# Patient Record
Sex: MALE | Race: WHITE | HISPANIC OR LATINO | Employment: FULL TIME | URBAN - METROPOLITAN AREA
[De-identification: names, ages, dates, MRNs, and addresses within clinical notes are randomized per-mention and may not be internally consistent; named-entity substitution may affect disease eponyms.]

---

## 2024-01-05 ENCOUNTER — OFFICE VISIT (OUTPATIENT)
Dept: NEUROLOGY | Facility: CLINIC | Age: 34
End: 2024-01-05
Payer: COMMERCIAL

## 2024-01-05 VITALS
OXYGEN SATURATION: 100 % | HEART RATE: 83 BPM | HEIGHT: 70 IN | SYSTOLIC BLOOD PRESSURE: 146 MMHG | DIASTOLIC BLOOD PRESSURE: 82 MMHG | WEIGHT: 176 LBS | BODY MASS INDEX: 25.2 KG/M2

## 2024-01-05 DIAGNOSIS — G43.109 MIGRAINE WITH AURA AND WITHOUT STATUS MIGRAINOSUS, NOT INTRACTABLE: Primary | ICD-10-CM

## 2024-01-05 PROCEDURE — 99203 OFFICE O/P NEW LOW 30 MIN: CPT | Performed by: PSYCHIATRY & NEUROLOGY

## 2024-01-05 PROCEDURE — 1160F RVW MEDS BY RX/DR IN RCRD: CPT | Performed by: PSYCHIATRY & NEUROLOGY

## 2024-01-05 PROCEDURE — 1159F MED LIST DOCD IN RCRD: CPT | Performed by: PSYCHIATRY & NEUROLOGY

## 2024-01-05 RX ORDER — PROPRANOLOL HYDROCHLORIDE 20 MG/1
20 TABLET ORAL 2 TIMES DAILY
Qty: 60 TABLET | Refills: 2 | Status: SHIPPED | OUTPATIENT
Start: 2024-01-05

## 2024-01-05 NOTE — ASSESSMENT & PLAN NOTE
33 year old right handed man with migraines with visual aura.  He was referred by his eye doctor as he was having vision changes thought to be related to migraine auras.  Starting January of last year he started noticing loss of peripheral vision one side at a time followed by a severe headache.  This lasts for 20 minutes or so but the headache takes 4-6 hours up to 24 hours in duration.  The headaches are unilateral involving the right side of his head and behind his right eye.  He has some light and sound sensitivity.  He has not noticed significant nausea.  He was having increasing frequency and he tells me after he started taking the omega 3 he was not having any further issues.  He was working hard in the past and since changing his job and tells me he has some anxiety due to his family not being here but he is trying to keep this under control.  He sleep about 6-7 hours usually and if he sleeps more than 9 hours he will wake up with headaches.  There is family history of migraines in his aunt and cousins.  He has not had the vision issues for the past 3 months.  He does get headaches sometimes.  He does have anxiety.  He does not smoke and does not use drugs.  He drinks a beer once a while.  I spoke with him about his symptoms which appears to be related to migraines with aura.  I will start him on propranolol which is a migraine preventative and can also help with anxiety for further assistance.  As his symptoms have improved we will hold off on any brain imaging at this time and he is in agreement and does not want to pursue imaging at this time.  Discussed migraine triggers and lifestyle modifications.

## 2024-01-05 NOTE — PROGRESS NOTES
Chief Complaint  Migraine (Not seen neurologist before- had vision loss before migraine- Eye doctor referred him here)    Subjective          Russel Saleh presents to [facility] NEUROLOGY for   HISTORY OF PRESENT ILLNESS:    Russel Saleh is a 33 year old right handed man who presents to neurology clinic for initial evaluation and treatment of migraines.  He was referred by his eye doctor as he was having vision changes thought to be related to migraine auras.  Starting January of last year he started noticing loss of peripheral vision one side at a time followed by a severe headache.  This lasts for 20 minutes or so but the headache takes 4-6 hours up to 24 hours in duration.  The headaches are unilateral involving the right side of his head and behind his right eye.  He has some light and sound sensitivity.  He has not noticed significant nausea.  He was having increasing frequency and he tells me after he started taking the omega 3 he was not having any further issues.  He was working hard in the past and since changing his job and tells me he has some anxiety due to his family not being here but he is trying to keep this under control.  He sleep about 6-7 hours usually and if he sleeps more than 9 hours he will wake up with headaches.  There is family history of migraines in his aunt and cousins.  He has not had the vision issues for the past 3 months.  He does get headaches sometimes.  He does have anxiety.  He does not smoke and does not use drugs.  He drinks a beer once a while.      Past Medical History:   Diagnosis Date    Migraine with aura and without status migrainosus, not intractable 1/5/2024    Vision loss 04/04/23        Family History   Problem Relation Age of Onset    Other Father         eye issues        Social History     Socioeconomic History    Marital status:    Tobacco Use    Smoking status: Never   Vaping Use    Vaping Use: Never used   Substance and  Mom informed     "Sexual Activity    Alcohol use: Not Currently     Alcohol/week: 4.0 standard drinks of alcohol     Types: 4 Cans of beer per week    Drug use: Never    Sexual activity: Yes        I have reviewed and confirmed the accuracy of the ROS as documented by the MA/LPN/RN Jace Mosquera MD   Review of Systems   Constitutional:  Negative for activity change and appetite change.   HENT:  Negative for trouble swallowing and voice change.    Eyes:  Positive for pain. Negative for blurred vision, redness and visual disturbance.   Gastrointestinal:  Negative for nausea and vomiting.   Musculoskeletal:  Positive for back pain. Negative for gait problem and neck pain.   Allergic/Immunologic: Negative for environmental allergies and food allergies.   Neurological:  Positive for headache. Negative for dizziness, tremors, seizures, syncope, facial asymmetry, speech difficulty, weakness, light-headedness, numbness, memory problem and confusion.   Psychiatric/Behavioral:  Positive for sleep disturbance. Negative for agitation, behavioral problems, decreased concentration, dysphoric mood, hallucinations, self-injury, suicidal ideas, negative for hyperactivity, depressed mood and stress. The patient is not nervous/anxious.         Objective   Vital Signs:   /82   Pulse 83   Ht 177.8 cm (70\")   Wt 79.8 kg (176 lb)   SpO2 100%   BMI 25.25 kg/m²       PHYSICAL EXAM:    General   Mental Status - Alert. General Appearance - Well developed, Well groomed, Oriented and Cooperative. Orientation - Oriented X3.       Head and Neck  Head - normocephalic, atraumatic with no lesions or palpable masses.  Neck    Global Assessment - supple.       Eye   Sclera/Conjunctiva - Bilateral - Normal.    ENMT  Mouth and Throat   Oral Cavity/Oropharynx: Oropharynx - the soft palate,uvula and tongue are normal in appearance.    Chest and Lung Exam   Chest - lung clear to auscultation bilaterally.    Cardiovascular   Cardiovascular examination reveals  - " normal heart sounds, regular rate and rhythm.    Neurologic   Mental Status: Speech - Normal. Cognitive function - appropriate fund of knowledge. No impairment of attention, Impairment of concentration, impairment of long term memory or impairment of short term memory.  Cranial Nerves:   II Optic: Visual acuity - Left - Normal. Right - Normal. Visual fields - Normal (to confrontation).  III Oculomotor: Pupillary constriction - Left - Normal. Right - Normal.  VII Facial: - Normal Bilaterally.   IX Glossopharyngeal / X Vagus - Normal.  XI Accessory: Trapezius - Bilateral - Normal. Sternocleidomastoid - Bilateral - Normal.  XII Hypoglossal - Bilateral - Normal.  Eye Movements: - Normal Bilaterally.  Sensory:   Light Touch: Intact - Globally.  Motor:   Bulk and Contour: - Normal.  Tone: - Normal.  Tremor: Not present.  Strength: 5/5 normal muscle strength - All Muscles.   General Assessment of Reflexes: - deep tendon reflexes are normal. Coordination - No Impairment of finger-to-nose or Impairment of rapid alternating movements. Gait - Normal.       Result Review :                 Assessment and Plan    Problem List Items Addressed This Visit          Neuro    Migraine with aura and without status migrainosus, not intractable - Primary    Current Assessment & Plan     33 year old right handed man with migraines with visual aura.  He was referred by his eye doctor as he was having vision changes thought to be related to migraine auras.  Starting January of last year he started noticing loss of peripheral vision one side at a time followed by a severe headache.  This lasts for 20 minutes or so but the headache takes 4-6 hours up to 24 hours in duration.  The headaches are unilateral involving the right side of his head and behind his right eye.  He has some light and sound sensitivity.  He has not noticed significant nausea.  He was having increasing frequency and he tells me after he started taking the omega 3 he was not  having any further issues.  He was working hard in the past and since changing his job and tells me he has some anxiety due to his family not being here but he is trying to keep this under control.  He sleep about 6-7 hours usually and if he sleeps more than 9 hours he will wake up with headaches.  There is family history of migraines in his aunt and cousins.  He has not had the vision issues for the past 3 months.  He does get headaches sometimes.  He does have anxiety.  He does not smoke and does not use drugs.  He drinks a beer once a while.  I spoke with him about his symptoms which appears to be related to migraines with aura.  I will start him on propranolol which is a migraine preventative and can also help with anxiety for further assistance.  As his symptoms have improved we will hold off on any brain imaging at this time and he is in agreement and does not want to pursue imaging at this time.  Discussed migraine triggers and lifestyle modifications.           Relevant Medications    propranolol (INDERAL) 20 MG tablet       Follow Up   Return in about 3 months (around 4/5/2024).  Patient was given instructions and counseling regarding his condition or for health maintenance advice. Please see specific information pulled into the AVS if appropriate.

## 2024-01-09 ENCOUNTER — PATIENT ROUNDING (BHMG ONLY) (OUTPATIENT)
Dept: NEUROLOGY | Facility: CLINIC | Age: 34
End: 2024-01-09
Payer: COMMERCIAL

## 2024-04-09 ENCOUNTER — OFFICE VISIT (OUTPATIENT)
Dept: NEUROLOGY | Facility: CLINIC | Age: 34
End: 2024-04-09
Payer: COMMERCIAL

## 2024-04-09 VITALS
BODY MASS INDEX: 25.05 KG/M2 | HEIGHT: 70 IN | WEIGHT: 175 LBS | DIASTOLIC BLOOD PRESSURE: 70 MMHG | SYSTOLIC BLOOD PRESSURE: 110 MMHG | OXYGEN SATURATION: 98 % | HEART RATE: 78 BPM

## 2024-04-09 DIAGNOSIS — G43.109 MIGRAINE WITH AURA AND WITHOUT STATUS MIGRAINOSUS, NOT INTRACTABLE: Primary | ICD-10-CM

## 2024-04-09 PROCEDURE — 1159F MED LIST DOCD IN RCRD: CPT | Performed by: PSYCHIATRY & NEUROLOGY

## 2024-04-09 PROCEDURE — 1160F RVW MEDS BY RX/DR IN RCRD: CPT | Performed by: PSYCHIATRY & NEUROLOGY

## 2024-04-09 PROCEDURE — 99213 OFFICE O/P EST LOW 20 MIN: CPT | Performed by: PSYCHIATRY & NEUROLOGY

## 2024-04-09 RX ORDER — PROPRANOLOL HYDROCHLORIDE 20 MG/1
20 TABLET ORAL 2 TIMES DAILY
Qty: 60 TABLET | Refills: 5 | Status: SHIPPED | OUTPATIENT
Start: 2024-04-09

## 2024-04-09 RX ORDER — IBUPROFEN 800 MG/1
800 TABLET ORAL EVERY 6 HOURS PRN
COMMUNITY

## 2024-04-09 NOTE — ASSESSMENT & PLAN NOTE
33 year old right handed man with migraines.  He was referred by his eye doctor as he was having vision changes thought to be related to migraine auras.  Starting January of last year he started noticing loss of peripheral vision one side at a time followed by a severe headache.  This lasts for 20 minutes or so but the headache takes 4-6 hours up to 24 hours in duration.  The headaches are unilateral involving the right side of his head and behind his right eye.  He has some light and sound sensitivity.  He has not noticed significant nausea.  He was having increasing frequency and he tells me after he started taking the omega 3 he was not having any further issues.  He was working hard in the past and since changing his job and tells me he has some anxiety due to his family not being here but he is trying to keep this under control.  He sleep about 6-7 hours usually and if he sleeps more than 9 hours he will wake up with headaches.  There is family history of migraines in his aunt and cousins.  He has not had the vision issues any further.  He does get headaches sometimes.  He does have anxiety.  He does not smoke and does not use drugs.  He drinks a beer once a while.  I started him on propranolol which has which has helped without any side effects and he would like to continue this medicine as he has remained migraine free.

## 2024-04-09 NOTE — PROGRESS NOTES
Chief Complaint  Migraine (Propranolol bid doing well- they have disappeared-)    Subjective          Russel Saleh presents to [facility] NEUROLOG[facility] for   HISTORY OF PRESENT ILLNESS:    Russel Saleh is a 33 year old right handed man who returns to neurology clinic for follow up evaluation and treatment of migraines.  He was referred by his eye doctor as he was having vision changes thought to be related to migraine auras.  Starting January of last year he started noticing loss of peripheral vision one side at a time followed by a severe headache.  This lasts for 20 minutes or so but the headache takes 4-6 hours up to 24 hours in duration.  The headaches are unilateral involving the right side of his head and behind his right eye.  He has some light and sound sensitivity.  He has not noticed significant nausea.  He was having increasing frequency and he tells me after he started taking the omega 3 he was not having any further issues.  He was working hard in the past and since changing his job and tells me he has some anxiety due to his family not being here but he is trying to keep this under control.  He sleep about 6-7 hours usually and if he sleeps more than 9 hours he will wake up with headaches.  There is family history of migraines in his aunt and cousins.  He has not had the vision issues any further.  He does get headaches sometimes.  He does have anxiety.  He does not smoke and does not use drugs.  He drinks a beer once a while.  I started him on propranolol which has helped without any side effects.      Past Medical History:   Diagnosis Date    Migraine with aura and without status migrainosus, not intractable 1/5/2024    Vision loss 04/04/23        Family History   Problem Relation Age of Onset    Other Father         eye issues        Social History     Socioeconomic History    Marital status:    Tobacco Use    Smoking status: Never   Vaping Use    Vaping status:  "Never Used   Substance and Sexual Activity    Alcohol use: Not Currently     Alcohol/week: 4.0 standard drinks of alcohol     Types: 4 Cans of beer per week    Drug use: Never    Sexual activity: Yes        I have reviewed and confirmed the accuracy of the ROS as documented by the MA/LPN/RN Jace Mosquera MD   Review of Systems   Neurological:  Positive for headache and memory problem. Negative for dizziness, tremors, seizures, syncope, facial asymmetry, speech difficulty, weakness, light-headedness, numbness and confusion.   Psychiatric/Behavioral:  Negative for agitation, behavioral problems, decreased concentration, dysphoric mood, hallucinations, self-injury, sleep disturbance, suicidal ideas, negative for hyperactivity, depressed mood and stress. The patient is not nervous/anxious.         Objective   Vital Signs:   /70   Pulse 78   Ht 177.8 cm (70\")   Wt 79.4 kg (175 lb)   SpO2 98%   BMI 25.11 kg/m²       PHYSICAL EXAM:    General   Mental Status - Alert. General Appearance - Well developed, Well groomed, Oriented and Cooperative. Orientation - Oriented X3.       Head and Neck  Head - normocephalic, atraumatic with no lesions or palpable masses.  Neck    Global Assessment - supple.       Eye   Sclera/Conjunctiva - Bilateral - Normal.    ENMT  Mouth and Throat   Oral Cavity/Oropharynx: Oropharynx - the soft palate,uvula and tongue are normal in appearance.    Chest and Lung Exam   Chest - lung clear to auscultation bilaterally.    Cardiovascular   Cardiovascular examination reveals  - normal heart sounds, regular rate and rhythm.    Neurologic   Mental Status: Speech - Normal. Cognitive function - appropriate fund of knowledge. No impairment of attention, Impairment of concentration, impairment of long term memory or impairment of short term memory.  Cranial Nerves:   II Optic: Visual acuity - Left - Normal. Right - Normal. Visual fields - Normal (to confrontation).  III Oculomotor: Pupillary " constriction - Left - Normal. Right - Normal.  VII Facial: - Normal Bilaterally.   IX Glossopharyngeal / X Vagus - Normal.  XI Accessory: Trapezius - Bilateral - Normal. Sternocleidomastoid - Bilateral - Normal.  XII Hypoglossal - Bilateral - Normal.  Eye Movements: - Normal Bilaterally.  Sensory:   Light Touch: Intact - Globally.  Motor:   Bulk and Contour: - Normal.  Tone: - Normal.  Tremor: Not present.  Strength: 5/5 normal muscle strength - All Muscles.   General Assessment of Reflexes: - deep tendon reflexes are normal. Coordination - No Impairment of finger-to-nose or Impairment of rapid alternating movements. Gait - Normal.       Result Review :                 Assessment and Plan    Problem List Items Addressed This Visit          Neuro    Migraine with aura and without status migrainosus, not intractable - Primary    Current Assessment & Plan     33 year old right handed man with migraines.  He was referred by his eye doctor as he was having vision changes thought to be related to migraine auras.  Starting January of last year he started noticing loss of peripheral vision one side at a time followed by a severe headache.  This lasts for 20 minutes or so but the headache takes 4-6 hours up to 24 hours in duration.  The headaches are unilateral involving the right side of his head and behind his right eye.  He has some light and sound sensitivity.  He has not noticed significant nausea.  He was having increasing frequency and he tells me after he started taking the omega 3 he was not having any further issues.  He was working hard in the past and since changing his job and tells me he has some anxiety due to his family not being here but he is trying to keep this under control.  He sleep about 6-7 hours usually and if he sleeps more than 9 hours he will wake up with headaches.  There is family history of migraines in his aunt and cousins.  He has not had the vision issues any further.  He does get headaches  sometimes.  He does have anxiety.  He does not smoke and does not use drugs.  He drinks a beer once a while.  I started him on propranolol which has which has helped without any side effects and he would like to continue this medicine as he has remained migraine free.               Relevant Medications    ibuprofen (ADVIL,MOTRIN) 800 MG tablet    propranolol (INDERAL) 20 MG tablet       Follow Up   Return in about 6 months (around 10/9/2024).  Patient was given instructions and counseling regarding his condition or for health maintenance advice. Please see specific information pulled into the AVS if appropriate.

## 2024-04-10 DIAGNOSIS — G43.109 MIGRAINE WITH AURA AND WITHOUT STATUS MIGRAINOSUS, NOT INTRACTABLE: ICD-10-CM

## 2024-08-21 ENCOUNTER — TRANSCRIBE ORDERS (OUTPATIENT)
Dept: ADMINISTRATIVE | Facility: HOSPITAL | Age: 34
End: 2024-08-21
Payer: COMMERCIAL

## 2024-10-11 DIAGNOSIS — G43.109 MIGRAINE WITH AURA AND WITHOUT STATUS MIGRAINOSUS, NOT INTRACTABLE: ICD-10-CM

## 2024-10-15 ENCOUNTER — OFFICE VISIT (OUTPATIENT)
Dept: NEUROLOGY | Facility: CLINIC | Age: 34
End: 2024-10-15
Payer: COMMERCIAL

## 2024-10-15 VITALS
HEART RATE: 68 BPM | DIASTOLIC BLOOD PRESSURE: 76 MMHG | HEIGHT: 70 IN | BODY MASS INDEX: 26.05 KG/M2 | OXYGEN SATURATION: 98 % | SYSTOLIC BLOOD PRESSURE: 120 MMHG | WEIGHT: 182 LBS

## 2024-10-15 DIAGNOSIS — G43.109 MIGRAINE WITH AURA AND WITHOUT STATUS MIGRAINOSUS, NOT INTRACTABLE: Primary | ICD-10-CM

## 2024-10-15 PROCEDURE — 99213 OFFICE O/P EST LOW 20 MIN: CPT | Performed by: PSYCHIATRY & NEUROLOGY

## 2024-10-15 RX ORDER — PROPRANOLOL HCL 20 MG
20 TABLET ORAL 2 TIMES DAILY
Qty: 60 TABLET | Refills: 5 | OUTPATIENT
Start: 2024-10-15

## 2024-10-15 RX ORDER — PROPRANOLOL HCL 20 MG
20 TABLET ORAL 2 TIMES DAILY
Qty: 60 TABLET | Refills: 5 | Status: SHIPPED | OUTPATIENT
Start: 2024-10-15

## 2024-10-15 NOTE — ASSESSMENT & PLAN NOTE
33 year old right handed man with migraines.  He was referred by his eye doctor as he was having vision changes thought to be related to migraine auras.  Starting January of last year he started noticing loss of peripheral vision one side at a time followed by a severe headache.  This lasts for 20 minutes or so but the headache takes 4-6 hours up to 24 hours in duration.  The headaches are unilateral involving the right side of his head and behind his right eye.  He has some light and sound sensitivity.  He has not noticed significant nausea.  He was having increasing frequency and he tells me after he started taking the omega 3 he was not having any further issues.  He was working hard in the past and since changing his job and tells me he has some anxiety due to his family not being here but he is trying to keep this under control.  He sleep about 6-7 hours usually and if he sleeps more than 9 hours he will wake up with headaches.  There is family history of migraines in his aunt and cousins.  He has not had the vision issues any further.  He does get headaches sometimes.  He does have anxiety.  He does not smoke and does not use drugs.  He drinks a beer once a while.  I started him on propranolol which has helped without any side effects.  He tells me he had a brain MRI scan performed in 9/5/2024 which did not demonstrate any acute intracranial abnormalities. I will continue the propranolol at current dose which is helping and he is doing well overall.  Will follow up in 6 months.

## 2024-10-15 NOTE — PROGRESS NOTES
Chief Complaint  Migraine (No new episodes MRI [facility] )    Subjective          Russel Saleh presents to [facility] NEUROLOGY for   HISTORY OF PRESENT ILLNESS:    Russel Saleh is a 33 year old right handed man who returns to neurology clinic for follow up evaluation and treatment of migraines.  He was referred by his eye doctor as he was having vision changes thought to be related to migraine auras.  Starting January of last year he started noticing loss of peripheral vision one side at a time followed by a severe headache.  This lasts for 20 minutes or so but the headache takes 4-6 hours up to 24 hours in duration.  The headaches are unilateral involving the right side of his head and behind his right eye.  He has some light and sound sensitivity.  He has not noticed significant nausea.  He was having increasing frequency and he tells me after he started taking the omega 3 he was not having any further issues.  He was working hard in the past and since changing his job and tells me he has some anxiety due to his family not being here but he is trying to keep this under control.  He sleep about 6-7 hours usually and if he sleeps more than 9 hours he will wake up with headaches.  There is family history of migraines in his aunt and cousins.  He has not had the vision issues any further.  He does get headaches sometimes.  He does have anxiety.  He does not smoke and does not use drugs.  He drinks a beer once a while.  I started him on propranolol which has helped without any side effects.  He tells me he had a brain MRI scan performed in 9/5/2024 which did not demonstrate any acute intracranial abnormalities.     Past Medical History:   Diagnosis Date    Migraine with aura and without status migrainosus, not intractable 1/5/2024    Vision loss 04/04/23        Family History   Problem Relation Age of Onset    Other Father         eye issues        Social History  "    Socioeconomic History    Marital status:    Tobacco Use    Smoking status: Never   Vaping Use    Vaping status: Never Used   Substance and Sexual Activity    Alcohol use: Not Currently     Alcohol/week: 4.0 standard drinks of alcohol     Types: 4 Cans of beer per week    Drug use: Never    Sexual activity: Yes        I have reviewed and confirmed the accuracy of the ROS as documented by the MA/LPN/RN Jace Mosquera MD   Review of Systems   Eyes:  Positive for blurred vision. Negative for double vision.   Gastrointestinal:  Negative for nausea and vomiting.   Musculoskeletal:  Negative for neck pain and neck stiffness.   Neurological:  Positive for headache. Negative for dizziness, tremors, seizures, syncope, facial asymmetry, speech difficulty, weakness, light-headedness, numbness, memory problem and confusion.   Psychiatric/Behavioral:  Negative for agitation, behavioral problems, decreased concentration, dysphoric mood, hallucinations, self-injury, sleep disturbance, suicidal ideas, negative for hyperactivity, depressed mood and stress. The patient is not nervous/anxious.         Objective   Vital Signs:   /76   Pulse 68   Ht 177.8 cm (70\")   Wt 82.6 kg (182 lb)   SpO2 98%   BMI 26.11 kg/m²       PHYSICAL EXAM:    General   Mental Status - Alert. General Appearance - Well developed, Well groomed, Oriented and Cooperative. Orientation - Oriented X3.       Head and Neck  Head - normocephalic, atraumatic with no lesions or palpable masses.  Neck    Global Assessment - supple.       Eye   Sclera/Conjunctiva - Bilateral - Normal.    ENMT  Mouth and Throat   Oral Cavity/Oropharynx: Oropharynx - the soft palate,uvula and tongue are normal in appearance.    Chest and Lung Exam   Chest - lung clear to auscultation bilaterally.    Cardiovascular   Cardiovascular examination reveals  - normal heart sounds, regular rate and rhythm.    Neurologic   Mental Status: Speech - Normal. Cognitive function - " appropriate fund of knowledge. No impairment of attention, Impairment of concentration, impairment of long term memory or impairment of short term memory.  Cranial Nerves:   II Optic: Visual acuity - Left - Normal. Right - Normal. Visual fields - Normal (to confrontation).  III Oculomotor: Pupillary constriction - Left - Normal. Right - Normal.  VII Facial: - Normal Bilaterally.   IX Glossopharyngeal / X Vagus - Normal.  XI Accessory: Trapezius - Bilateral - Normal. Sternocleidomastoid - Bilateral - Normal.  XII Hypoglossal - Bilateral - Normal.  Eye Movements: - Normal Bilaterally.  Sensory:   Light Touch: Intact - Globally.  Motor:   Bulk and Contour: - Normal.  Tone: - Normal.  Tremor: Not present.  Strength: 5/5 normal muscle strength - All Muscles.   General Assessment of Reflexes: - deep tendon reflexes are normal. Coordination - No Impairment of finger-to-nose or Impairment of rapid alternating movements. Gait - Normal.       Result Review :                 Assessment and Plan    Problem List Items Addressed This Visit       Migraine with aura and without status migrainosus, not intractable - Primary    Current Assessment & Plan     33 year old right handed man with migraines.  He was referred by his eye doctor as he was having vision changes thought to be related to migraine auras.  Starting January of last year he started noticing loss of peripheral vision one side at a time followed by a severe headache.  This lasts for 20 minutes or so but the headache takes 4-6 hours up to 24 hours in duration.  The headaches are unilateral involving the right side of his head and behind his right eye.  He has some light and sound sensitivity.  He has not noticed significant nausea.  He was having increasing frequency and he tells me after he started taking the omega 3 he was not having any further issues.  He was working hard in the past and since changing his job and tells me he has some anxiety due to his family not  being here but he is trying to keep this under control.  He sleep about 6-7 hours usually and if he sleeps more than 9 hours he will wake up with headaches.  There is family history of migraines in his aunt and cousins.  He has not had the vision issues any further.  He does get headaches sometimes.  He does have anxiety.  He does not smoke and does not use drugs.  He drinks a beer once a while.  I started him on propranolol which has helped without any side effects.  He tells me he had a brain MRI scan performed in 9/5/2024 which did not demonstrate any acute intracranial abnormalities. I will continue the propranolol at current dose which is helping and he is doing well overall.  Will follow up in 6 months.           Relevant Medications    propranolol (INDERAL) 20 MG tablet       Follow Up   Return in about 6 months (around 4/15/2025).  Patient was given instructions and counseling regarding his condition or for health maintenance advice. Please see specific information pulled into the AVS if appropriate.

## 2025-04-15 ENCOUNTER — OFFICE VISIT (OUTPATIENT)
Dept: NEUROLOGY | Facility: CLINIC | Age: 35
End: 2025-04-15
Payer: COMMERCIAL

## 2025-04-15 VITALS
BODY MASS INDEX: 25.48 KG/M2 | HEART RATE: 61 BPM | HEIGHT: 70 IN | OXYGEN SATURATION: 98 % | SYSTOLIC BLOOD PRESSURE: 124 MMHG | DIASTOLIC BLOOD PRESSURE: 68 MMHG | WEIGHT: 178 LBS

## 2025-04-15 DIAGNOSIS — G43.109 MIGRAINE WITH AURA AND WITHOUT STATUS MIGRAINOSUS, NOT INTRACTABLE: Primary | ICD-10-CM

## 2025-04-15 PROCEDURE — 99213 OFFICE O/P EST LOW 20 MIN: CPT | Performed by: PSYCHIATRY & NEUROLOGY

## 2025-04-15 RX ORDER — PROPRANOLOL HCL 20 MG
20 TABLET ORAL 2 TIMES DAILY
Qty: 180 TABLET | Refills: 1 | Status: SHIPPED | OUTPATIENT
Start: 2025-04-15

## 2025-04-15 NOTE — PROGRESS NOTES
Chief Complaint  Migraine (Has been doing really good, the other day he didn't sleep well and had one random headache)    Subjective          Russel Saleh presents to [facility] NEUROLOGY for   HISTORY OF PRESENT ILLNESS:    Russel Saleh is a 34 year old right handed man who returns to neurology clinic for follow up evaluation and treatment of migraines.  He was referred by his eye doctor as he was having vision changes thought to be related to migraine auras.  Starting January of 2023 he started noticing loss of peripheral vision one side at a time followed by a severe headache.  This lasts for 20 minutes or so but the headache takes 4-6 hours up to 24 hours in duration.  The headaches are unilateral involving the right side of his head and behind his right eye.  He has some light and sound sensitivity.  He has not noticed significant nausea.  He was having increasing frequency and he tells me after he started taking the omega 3 he was not having any further issues.  He was working hard in the past and since changing his job and tells me he has some anxiety due to his family not being here but he is trying to keep this under control and he is doing well with this as well.  He sleep about 6-7 hours usually and if he sleeps more than 9 hours he will wake up with headaches and he does not snore.  There is family history of migraines in his aunt and cousins.  He has not had the vision issues any further.  He does get headache rarely due to not sleeping well.  He does have anxiety.  He does not smoke and does not use drugs.  He drinks a beer once a while.  I started him on propranolol which has helped without any side effects and he is doing well with this medicine with improvement in anxiety and headaches.  He had a brain MRI scan performed in 9/5/2024 which did not demonstrate any acute intracranial abnormalities.  Overall he is doing well and happy with current dose of propranolol.   "    Past Medical History:   Diagnosis Date    Migraine with aura and without status migrainosus, not intractable 1/5/2024    Vision loss 04/04/23        Family History   Problem Relation Age of Onset    Other Father         eye issues        Social History     Socioeconomic History    Marital status:    Tobacco Use    Smoking status: Never   Vaping Use    Vaping status: Never Used   Substance and Sexual Activity    Alcohol use: Not Currently     Alcohol/week: 4.0 standard drinks of alcohol     Types: 4 Cans of beer per week    Drug use: Never    Sexual activity: Yes        I have reviewed and confirmed the accuracy of the ROS as documented by the MA/LPN/RN Jace Mosquera MD   Review of Systems   Neurological:  Positive for headache. Negative for dizziness, tremors, seizures, syncope, facial asymmetry, speech difficulty, weakness, light-headedness, numbness, memory problem and confusion.   Psychiatric/Behavioral:  Positive for sleep disturbance. Negative for agitation, behavioral problems, decreased concentration, dysphoric mood, hallucinations, self-injury, suicidal ideas, negative for hyperactivity, depressed mood and stress. The patient is not nervous/anxious.         Objective   Vital Signs:   /68   Pulse 61   Ht 177.8 cm (70\")   Wt 80.7 kg (178 lb)   SpO2 98%   BMI 25.54 kg/m²       PHYSICAL EXAM:    General   Mental Status - Alert. General Appearance - Well developed, Well groomed, Oriented and Cooperative. Orientation - Oriented X3.       Head and Neck  Head - normocephalic, atraumatic with no lesions or palpable masses.  Neck    Global Assessment - supple.       Eye   Sclera/Conjunctiva - Bilateral - Normal.    ENMT  Mouth and Throat   Oral Cavity/Oropharynx: Oropharynx - the soft palate,uvula and tongue are normal in appearance.    Chest and Lung Exam   Chest - lung clear to auscultation bilaterally.    Cardiovascular   Cardiovascular examination reveals  - normal heart sounds, regular " rate and rhythm.    Neurologic   Mental Status: Speech - Normal. Cognitive function - appropriate fund of knowledge. No impairment of attention, Impairment of concentration, impairment of long term memory or impairment of short term memory.  Cranial Nerves:   II Optic: Visual acuity - Left - Normal. Right - Normal. Visual fields - Normal (to confrontation).  III Oculomotor: Pupillary constriction - Left - Normal. Right - Normal.  VII Facial: - Normal Bilaterally.   IX Glossopharyngeal / X Vagus - Normal.  XI Accessory: Trapezius - Bilateral - Normal. Sternocleidomastoid - Bilateral - Normal.  XII Hypoglossal - Bilateral - Normal.  Eye Movements: - Normal Bilaterally.  Sensory:   Light Touch: Intact - Globally.  Motor:   Bulk and Contour: - Normal.  Tone: - Normal.  Tremor: Not present.  Strength: 5/5 normal muscle strength - All Muscles.   General Assessment of Reflexes: - deep tendon reflexes are normal. Coordination - No Impairment of finger-to-nose or Impairment of rapid alternating movements. Gait - Normal.       Result Review :                 Assessment and Plan    Problem List Items Addressed This Visit       Migraine with aura and without status migrainosus, not intractable - Primary    Current Assessment & Plan   34 year old right handed man with migraines which are well controlled with taking propranolol preventatively.  He was referred by his eye doctor as he was having vision changes thought to be related to migraine auras.  Starting January of 2023 he started noticing loss of peripheral vision one side at a time followed by a severe headache.  This lasts for 20 minutes or so but the headache takes 4-6 hours up to 24 hours in duration.  The headaches are unilateral involving the right side of his head and behind his right eye.  He has some light and sound sensitivity.  He has not noticed significant nausea.  He was having increasing frequency and he tells me after he started taking the omega 3 he was not  having any further issues.  He was working hard in the past and since changing his job and tells me he has some anxiety due to his family not being here but he is trying to keep this under control and he is doing well with this as well.  He sleep about 6-7 hours usually and if he sleeps more than 9 hours he will wake up with headaches and he does not snore.  There is family history of migraines in his aunt and cousins.  He has not had the vision issues any further.  He does get headache rarely due to not sleeping well.  He does have anxiety.  He does not smoke and does not use drugs.  He drinks a beer once a while.  I started him on propranolol which has helped without any side effects and he is doing well with this medicine with improvement in anxiety and headaches.  He had a brain MRI scan performed in 9/5/2024 which did not demonstrate any acute intracranial abnormalities.  Overall he is doing well and happy with current dose of propranolol for prevention and Tylenol as needed infrequently.  I will continue this regimen for patient today, he denies any side effects, he will try to reduce the dose and see if he continues to do well with regards to his migraines.  Discussed migraine triggers and lifestyle modifications.           Relevant Medications    propranolol (INDERAL) 20 MG tablet       Follow Up   Return in about 6 months (around 10/15/2025).  Patient was given instructions and counseling regarding his condition or for health maintenance advice. Please see specific information pulled into the AVS if appropriate.

## 2025-04-15 NOTE — ASSESSMENT & PLAN NOTE
34 year old right handed man with migraines which are well controlled with taking propranolol preventatively.  He was referred by his eye doctor as he was having vision changes thought to be related to migraine auras.  Starting January of 2023 he started noticing loss of peripheral vision one side at a time followed by a severe headache.  This lasts for 20 minutes or so but the headache takes 4-6 hours up to 24 hours in duration.  The headaches are unilateral involving the right side of his head and behind his right eye.  He has some light and sound sensitivity.  He has not noticed significant nausea.  He was having increasing frequency and he tells me after he started taking the omega 3 he was not having any further issues.  He was working hard in the past and since changing his job and tells me he has some anxiety due to his family not being here but he is trying to keep this under control and he is doing well with this as well.  He sleep about 6-7 hours usually and if he sleeps more than 9 hours he will wake up with headaches and he does not snore.  There is family history of migraines in his aunt and cousins.  He has not had the vision issues any further.  He does get headache rarely due to not sleeping well.  He does have anxiety.  He does not smoke and does not use drugs.  He drinks a beer once a while.  I started him on propranolol which has helped without any side effects and he is doing well with this medicine with improvement in anxiety and headaches.  He had a brain MRI scan performed in 9/5/2024 which did not demonstrate any acute intracranial abnormalities.  Overall he is doing well and happy with current dose of propranolol for prevention and Tylenol as needed infrequently.  I will continue this regimen for patient today, he denies any side effects, he will try to reduce the dose and see if he continues to do well with regards to his migraines.  Discussed migraine triggers and lifestyle modifications.

## 2025-04-23 DIAGNOSIS — G43.109 MIGRAINE WITH AURA AND WITHOUT STATUS MIGRAINOSUS, NOT INTRACTABLE: ICD-10-CM

## 2025-04-23 RX ORDER — PROPRANOLOL HCL 20 MG
20 TABLET ORAL 2 TIMES DAILY
Qty: 60 TABLET | OUTPATIENT
Start: 2025-04-23